# Patient Record
Sex: FEMALE | Race: WHITE
[De-identification: names, ages, dates, MRNs, and addresses within clinical notes are randomized per-mention and may not be internally consistent; named-entity substitution may affect disease eponyms.]

---

## 2017-12-30 ENCOUNTER — HOSPITAL ENCOUNTER (EMERGENCY)
Dept: HOSPITAL 35 - ER | Age: 82
Discharge: HOME | End: 2017-12-30
Payer: COMMERCIAL

## 2017-12-30 VITALS — SYSTOLIC BLOOD PRESSURE: 152 MMHG | DIASTOLIC BLOOD PRESSURE: 76 MMHG

## 2017-12-30 VITALS — WEIGHT: 89.99 LBS | BODY MASS INDEX: 18.14 KG/M2 | HEIGHT: 59 IN

## 2017-12-30 DIAGNOSIS — R53.1: Primary | ICD-10-CM

## 2017-12-30 LAB
ANION GAP SERPL CALC-SCNC: 5 MMOL/L (ref 7–16)
BILIRUB UR-MCNC: NEGATIVE MG/DL
BUN SERPL-MCNC: 13 MG/DL (ref 7–18)
CALCIUM SERPL-MCNC: 9.3 MG/DL (ref 8.5–10.1)
CHLORIDE SERPL-SCNC: 108 MMOL/L (ref 98–107)
CO2 SERPL-SCNC: 29 MMOL/L (ref 21–32)
COLOR UR: YELLOW
CREAT SERPL-MCNC: 0.6 MG/DL (ref 0.6–1)
ERYTHROCYTE [DISTWIDTH] IN BLOOD BY AUTOMATED COUNT: 13.8 % (ref 10.5–14.5)
GLUCOSE SERPL-MCNC: 95 MG/DL (ref 74–106)
HCT VFR BLD CALC: 41.7 % (ref 37–47)
HGB BLD-MCNC: 14.3 GM/DL (ref 12–15)
KETONES UR STRIP-MCNC: NEGATIVE MG/DL
MCH RBC QN AUTO: 30.4 PG (ref 26–34)
MCHC RBC AUTO-ENTMCNC: 34.2 G/DL (ref 28–37)
MCV RBC: 88.8 FL (ref 80–100)
PLATELET # BLD: 168 THOU/UL (ref 150–400)
POTASSIUM SERPL-SCNC: 4 MMOL/L (ref 3.5–5.1)
RBC # BLD AUTO: 4.7 MIL/UL (ref 4.2–5)
RBC # UR STRIP: NEGATIVE /UL
SODIUM SERPL-SCNC: 142 MMOL/L (ref 136–145)
SP GR UR STRIP: 1.01 (ref 1–1.03)
TROPONIN I SERPL-MCNC: < 0.04 NG/ML (ref ?–0.06)
URINE CLARITY: CLEAR
URINE GLUCOSE-RANDOM*: NEGATIVE
URINE LEUKOCYTES-REFLEX: NEGATIVE
URINE NITRITE-REFLEX: NEGATIVE
URINE PROTEIN (DIPSTICK): NEGATIVE
UROBILINOGEN UR STRIP-ACNC: 0.2 E.U./DL (ref 0.2–1)
WBC # BLD AUTO: 6.9 THOU/UL (ref 4–11)

## 2018-07-07 ENCOUNTER — HOSPITAL ENCOUNTER (EMERGENCY)
Dept: HOSPITAL 35 - ER | Age: 83
Discharge: HOME | End: 2018-07-07
Payer: COMMERCIAL

## 2018-07-07 VITALS — HEIGHT: 59 IN | BODY MASS INDEX: 17.94 KG/M2 | WEIGHT: 89 LBS

## 2018-07-07 VITALS — DIASTOLIC BLOOD PRESSURE: 71 MMHG | SYSTOLIC BLOOD PRESSURE: 165 MMHG

## 2018-07-07 DIAGNOSIS — S20.222A: Primary | ICD-10-CM
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
## 2018-07-07 DIAGNOSIS — S00.83XA: ICD-10-CM
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
## 2018-10-01 ENCOUNTER — HOSPITAL ENCOUNTER (EMERGENCY)
Dept: HOSPITAL 35 - ER | Age: 83
Discharge: HOME | End: 2018-10-01
Payer: COMMERCIAL

## 2018-10-01 VITALS — HEIGHT: 59 IN | BODY MASS INDEX: 17.94 KG/M2 | WEIGHT: 89 LBS

## 2018-10-01 VITALS — DIASTOLIC BLOOD PRESSURE: 78 MMHG | SYSTOLIC BLOOD PRESSURE: 163 MMHG
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
## 2018-10-01 DIAGNOSIS — G20: ICD-10-CM
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
## 2018-10-01 DIAGNOSIS — S01.81XA: Primary | ICD-10-CM

## 2018-10-01 LAB
ANION GAP SERPL CALC-SCNC: 3 MMOL/L (ref 7–16)
BUN SERPL-MCNC: 11 MG/DL (ref 7–18)
CALCIUM SERPL-MCNC: 9.8 MG/DL (ref 8.5–10.1)
CHLORIDE SERPL-SCNC: 106 MMOL/L (ref 98–107)
CO2 SERPL-SCNC: 29 MMOL/L (ref 21–32)
CREAT SERPL-MCNC: 0.7 MG/DL (ref 0.6–1)
ERYTHROCYTE [DISTWIDTH] IN BLOOD BY AUTOMATED COUNT: 13.7 % (ref 10.5–14.5)
GLUCOSE SERPL-MCNC: 96 MG/DL (ref 74–106)
HCT VFR BLD CALC: 43.6 % (ref 37–47)
HGB BLD-MCNC: 14.7 GM/DL (ref 12–15)
MCH RBC QN AUTO: 29.9 PG (ref 26–34)
MCHC RBC AUTO-ENTMCNC: 33.7 G/DL (ref 28–37)
MCV RBC: 88.8 FL (ref 80–100)
PLATELET # BLD: 186 THOU/UL (ref 150–400)
POTASSIUM SERPL-SCNC: 4.2 MMOL/L (ref 3.5–5.1)
RBC # BLD AUTO: 4.91 MIL/UL (ref 4.2–5)
SODIUM SERPL-SCNC: 138 MMOL/L (ref 136–145)
WBC # BLD AUTO: 7 THOU/UL (ref 4–11)

## 2018-10-23 ENCOUNTER — HOSPITAL ENCOUNTER (INPATIENT)
Dept: HOSPITAL 35 - ER | Age: 83
LOS: 2 days | Discharge: HOME HEALTH | DRG: 193 | End: 2018-10-25
Attending: HOSPITALIST | Admitting: HOSPITALIST
Payer: COMMERCIAL

## 2018-10-23 VITALS — HEIGHT: 59.02 IN | WEIGHT: 88 LBS | BODY MASS INDEX: 17.74 KG/M2

## 2018-10-23 VITALS — DIASTOLIC BLOOD PRESSURE: 79 MMHG | SYSTOLIC BLOOD PRESSURE: 145 MMHG

## 2018-10-23 VITALS — DIASTOLIC BLOOD PRESSURE: 68 MMHG | SYSTOLIC BLOOD PRESSURE: 133 MMHG

## 2018-10-23 VITALS — DIASTOLIC BLOOD PRESSURE: 73 MMHG | SYSTOLIC BLOOD PRESSURE: 154 MMHG

## 2018-10-23 DIAGNOSIS — J10.00: Primary | ICD-10-CM

## 2018-10-23 DIAGNOSIS — H10.9: ICD-10-CM

## 2018-10-23 DIAGNOSIS — J30.2: ICD-10-CM

## 2018-10-23 DIAGNOSIS — Z79.899: ICD-10-CM

## 2018-10-23 DIAGNOSIS — E43: ICD-10-CM

## 2018-10-23 LAB
ALBUMIN SERPL-MCNC: 3.2 G/DL (ref 3.4–5)
ALT SERPL-CCNC: 14 U/L (ref 30–65)
ANION GAP SERPL CALC-SCNC: 5 MMOL/L (ref 7–16)
AST SERPL-CCNC: 14 U/L (ref 15–37)
BASOPHILS NFR BLD AUTO: 0.7 % (ref 0–2)
BILIRUB SERPL-MCNC: 0.7 MG/DL
BILIRUB UR-MCNC: NEGATIVE MG/DL
BUN SERPL-MCNC: 12 MG/DL (ref 7–18)
CALCIUM SERPL-MCNC: 9.5 MG/DL (ref 8.5–10.1)
CHLORIDE SERPL-SCNC: 106 MMOL/L (ref 98–107)
CO2 SERPL-SCNC: 32 MMOL/L (ref 21–32)
COLOR UR: YELLOW
CREAT SERPL-MCNC: 0.7 MG/DL (ref 0.6–1)
EOSINOPHIL NFR BLD: 2.1 % (ref 0–3)
ERYTHROCYTE [DISTWIDTH] IN BLOOD BY AUTOMATED COUNT: 13.9 % (ref 10.5–14.5)
GLUCOSE SERPL-MCNC: 85 MG/DL (ref 74–106)
GRANULOCYTES NFR BLD MANUAL: 67.6 % (ref 36–66)
HCT VFR BLD CALC: 41.1 % (ref 37–47)
HGB BLD-MCNC: 13.8 GM/DL (ref 12–15)
KETONES UR STRIP-MCNC: NEGATIVE MG/DL
LYMPHOCYTES NFR BLD AUTO: 21.6 % (ref 24–44)
MCH RBC QN AUTO: 29.9 PG (ref 26–34)
MCHC RBC AUTO-ENTMCNC: 33.6 G/DL (ref 28–37)
MCV RBC: 88.8 FL (ref 80–100)
MONOCYTES NFR BLD: 8 % (ref 1–8)
NEUTROPHILS # BLD: 5.8 THOU/UL (ref 1.4–8.2)
PLATELET # BLD: 178 THOU/UL (ref 150–400)
POTASSIUM SERPL-SCNC: 4.4 MMOL/L (ref 3.5–5.1)
PROT SERPL-MCNC: 6.7 G/DL (ref 6.4–8.2)
RBC # BLD AUTO: 4.62 MIL/UL (ref 4.2–5)
RBC # UR STRIP: NEGATIVE /UL
SODIUM SERPL-SCNC: 143 MMOL/L (ref 136–145)
SP GR UR STRIP: 1.01 (ref 1–1.03)
TROPONIN I SERPL-MCNC: <0.06 NG/ML (ref ?–0.06)
URINE CLARITY: CLEAR
URINE GLUCOSE-RANDOM*: NEGATIVE
URINE LEUKOCYTES-REFLEX: (no result)
URINE NITRITE-REFLEX: NEGATIVE
URINE PROTEIN (DIPSTICK): NEGATIVE
UROBILINOGEN UR STRIP-ACNC: 1 E.U./DL (ref 0.2–1)
WBC # BLD AUTO: 8.6 THOU/UL (ref 4–11)

## 2018-10-23 PROCEDURE — 10783: CPT

## 2018-10-23 PROCEDURE — 10183: CPT

## 2018-10-24 VITALS — SYSTOLIC BLOOD PRESSURE: 124 MMHG | DIASTOLIC BLOOD PRESSURE: 62 MMHG

## 2018-10-24 VITALS — SYSTOLIC BLOOD PRESSURE: 158 MMHG | DIASTOLIC BLOOD PRESSURE: 73 MMHG

## 2018-10-24 VITALS — SYSTOLIC BLOOD PRESSURE: 175 MMHG | DIASTOLIC BLOOD PRESSURE: 90 MMHG

## 2018-10-24 VITALS — DIASTOLIC BLOOD PRESSURE: 71 MMHG | SYSTOLIC BLOOD PRESSURE: 134 MMHG

## 2018-10-24 LAB
ANION GAP SERPL CALC-SCNC: 6 MMOL/L (ref 7–16)
BUN SERPL-MCNC: 8 MG/DL (ref 7–18)
CALCIUM SERPL-MCNC: 8.9 MG/DL (ref 8.5–10.1)
CHLORIDE SERPL-SCNC: 108 MMOL/L (ref 98–107)
CO2 SERPL-SCNC: 27 MMOL/L (ref 21–32)
CREAT SERPL-MCNC: 0.6 MG/DL (ref 0.6–1)
ERYTHROCYTE [DISTWIDTH] IN BLOOD BY AUTOMATED COUNT: 13.7 % (ref 10.5–14.5)
GLUCOSE SERPL-MCNC: 111 MG/DL (ref 74–106)
HCT VFR BLD CALC: 38.7 % (ref 37–47)
HGB BLD-MCNC: 13 GM/DL (ref 12–15)
MCH RBC QN AUTO: 29.7 PG (ref 26–34)
MCHC RBC AUTO-ENTMCNC: 33.5 G/DL (ref 28–37)
MCV RBC: 88.6 FL (ref 80–100)
PLATELET # BLD: 173 THOU/UL (ref 150–400)
POTASSIUM SERPL-SCNC: 3.8 MMOL/L (ref 3.5–5.1)
RBC # BLD AUTO: 4.37 MIL/UL (ref 4.2–5)
SODIUM SERPL-SCNC: 141 MMOL/L (ref 136–145)
WBC # BLD AUTO: 7.7 THOU/UL (ref 4–11)

## 2018-10-25 VITALS — DIASTOLIC BLOOD PRESSURE: 63 MMHG | SYSTOLIC BLOOD PRESSURE: 143 MMHG

## 2018-10-25 VITALS — SYSTOLIC BLOOD PRESSURE: 143 MMHG | DIASTOLIC BLOOD PRESSURE: 63 MMHG

## 2020-08-23 ENCOUNTER — HOSPITAL ENCOUNTER (EMERGENCY)
Dept: HOSPITAL 35 - ER | Age: 85
Discharge: HOME | End: 2020-08-23
Payer: COMMERCIAL

## 2020-08-23 VITALS — DIASTOLIC BLOOD PRESSURE: 66 MMHG | SYSTOLIC BLOOD PRESSURE: 134 MMHG

## 2020-08-23 VITALS — WEIGHT: 84 LBS | BODY MASS INDEX: 16.49 KG/M2 | HEIGHT: 60 IN
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
## 2020-08-23 DIAGNOSIS — S01.112A: Primary | ICD-10-CM
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
## 2020-08-23 DIAGNOSIS — S50.02XA: ICD-10-CM
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
## 2020-08-23 DIAGNOSIS — Z91.09: ICD-10-CM
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
## 2020-08-23 NOTE — EMS
[address]   [address]                     EMS Patient Care Report       
_______________________________________________________________________________
 
Name:       ALEX HINDS                  Room #:                     REG NESHA GARCIA#:      [id number]                       Account #:      [id number]  
Admission:  20    Attend Phys:                          
Discharge:              Date of Birth:  [birth year]  
                                                          Report #: [id number]
                                                                    [id number]
_______________________________________________________________________________
THIS REPORT FOR:   //name//                          
 
Report Transmitted: 2020 13:04
EMS Care Summary
[place], [place]/[facility]
Incident 20-021958 @ 2020 12:05
 
Incident Location
[address]
 
Patient
ALEX HINDS
Female, 86 Years
 1933
 
Patient Address
[address] [place] [address]
[address]
[place] [phone]
 
Patient History
Parkinson's Disease,Depression,
 
Patient Allergies
No known allergies,
 
 
Chief Complaint
HEAD LAC/ CONTUSION
 
Disposition
Transported No Lights/[place]
 
Dispatch Reason
Falls
 
Transported To
[facility]
 
Narrative
UPON ARRIVAL PT BEING BROUGHT OUT TO AMBULANCE IN WHEELCHAIR. PT HAD TRIPPED 
INSIDE HITTING HER FOREHEAD ON GROUND. NURSE DENIES ANY LOC. NURSE STATES PT IS 
ACTING NORMAL. C-SPIBE CLEARED. PT LIFTED TO COT. PT HAS A LAC AND CONTUSION TO 
L EYEBROW AREA. BLEEDING HAS STOPPED. PT NOT ON BLOOD THINNERS. PT ALSO HAS 
SKIN TEAR TO L ELBOW. PT TRANSPORTED TO [facility]. 
 
 
 
[address]   [address]                     EMS Patient Care Report       
_______________________________________________________________________________
 
Name:       ALEX HINDS                  Room #:                     REG NESHA GARCIA#:      [id number]                       Account #:      [id number]  
Admission:  20    Attend Phys:                          
Discharge:              Date of Birth:  [birth year]  
                                                          Report #: [id number]
                                                                    [id number]
_______________________________________________________________________________
 
Initial Vitals
@12:20P: 73,R: 20,BP: 132/73,Pain: 2/10,GCS: 15,SpO2: 97,Revised Trauma: 12,
@12:27P: 71,R: 20,BP: 130/72,GCS: 15,CO: 0,SpO2: 95,Revised Trauma: 12,
 
Assessments
@12:17MENTAL:Person Oriented,Time Oriented,Event Oriented,Place 
Oriented,SKIN:HEENT:Head/Face: No Abnormalities,LUNG SOUNDS:General: No 
Abnormalities,ABDOMEN:General: No Abnormalities,PELVIS//GI:EXTREMITIES:Left 
Arm: No Abnormalities,Right Arm: No Abnormalities,Left Leg: No 
Abnormalities,Right Leg: No Abnormalities,PULSE:Radial: 2+ Normal,NEURO:No 
Abnormalities, 
 
Impression
Injury of Head
 
Procedures
@12:17ALS AssessmentResponse: UnchangedSucceeded@12:18C-Spine 
ClearanceResponse: Unchanged 
 
Timeline
12:03,Call Received
12:03,Dispatch Notified
12:05,Dispatched
12:06,En Route
12:15,On Scene
12:16,At Patient
12:17,ALS Assessment,Response: UnchangedSucceeded,
12:18,C-Spine Clearance,Response: Unchanged
12:20,BP: 132/73 M,PULSE: 73,RR: 20 R,SPO2: 97 Ox,ETCO2:  ,BG: ,PAIN: 2,GCS: 15,
12:21,Depart Scene
12:27,BP: 130/72 M,PULSE: 71,RR: 20 R,SPO2: 95 Ox,ETCO2:  ,BG: ,PAIN: ,GCS: 15,
12:29,At Destination
12:48,Call Closed
 
Disclaimer
v1.1     Copyright 2020 Info
This EMS Care Summary contains data elements from the applicable legal record 
(which may be displayed differently). It is designed to provide pertinent 
information for the following purposes: continuity of care, clinical quality, 
and state data reporting. The complete legal record is available to ED staff 
and administrators of the receiving hospital in [employer]'s Patient Tracker. All data 
is provided "as is."

## 2020-08-24 NOTE — EKG
[facility]
[address]
[place]   [address]                     ELECTROCARDIOGRAM REPORT      
_______________________________________________________________________________
 
Name:       ALEX HINDS                  Room #:                     [facility]#:      [id number]                       Account #:      [id number]  
Admission:  20    Attend Phys:                          
Discharge:  20    Date of Birth:  [birth year]  
                                                          Report #: [id number]
                                                                    [id number]
_______________________________________________________________________________
THIS REPORT FOR:  
 
cc:  Ra Candelaria MD, Muhammed K. MD Lundgren,Tanmay ROME MD [facility]
THIS REPORT FOR:   //name//                          
 
                         [facility] ED
                                       
Test Date:    2020               Test Time:    18:14:07
Pat Name:     ALEX HINDS               Department:   
Patient ID:   [id number]            Room:          
Gender:                               Technician:   [facility]
:          1933               Requested By: Mohan Noriega
Order Number: [id number] MD:   Tanmay Lucio
                                 Measurements
Intervals                              Axis          
Rate:         80                       P:            0
AR:           124                      QRS:          30
QRSD:         94                       T:            72
QT:           374                                    
QTc:          432                                    
                           Interpretive Statements
Sinus rhythm
No significant abnormality
Compared to ECG 10/23/2018 12:39:54
No significant change was found
Electronically Signed On 2020 9:27:25 CDT by Tanmay Lucio
[email or web address]
 
 
 
 
 
 
 
 
 
 
 
 
 
 
 
  <ELECTRONICALLY SIGNED>
   By: Tanmay Lucio MD, [facility]   
  20     0927
D: 20 1814                           _____________________________________
T: 20 1814                           Tanmay Lucio MD, [facility]     /EPI